# Patient Record
Sex: FEMALE | Race: WHITE | Employment: OTHER | URBAN - METROPOLITAN AREA
[De-identification: names, ages, dates, MRNs, and addresses within clinical notes are randomized per-mention and may not be internally consistent; named-entity substitution may affect disease eponyms.]

---

## 2017-05-10 ENCOUNTER — HOSPITAL ENCOUNTER (OUTPATIENT)
Dept: MAMMOGRAPHY | Age: 67
Discharge: HOME | End: 2017-05-10
Attending: SURGERY
Payer: MEDICARE

## 2017-05-10 DIAGNOSIS — Z12.31 SCREENING MAMMOGRAM, ENCOUNTER FOR: ICD-10-CM

## 2017-05-10 PROCEDURE — 77067 SCR MAMMO BI INCL CAD: CPT

## 2017-05-16 ENCOUNTER — HOSPITAL ENCOUNTER (OUTPATIENT)
Dept: MAMMOGRAPHY | Age: 67
Discharge: HOME | End: 2017-05-16
Attending: SURGERY
Payer: MEDICARE

## 2017-05-16 DIAGNOSIS — Z85.3 HX OF BREAST CANCER: ICD-10-CM

## 2017-05-16 DIAGNOSIS — R92.8 ABNORMAL SCREENING MAMMOGRAM: ICD-10-CM

## 2017-05-16 PROCEDURE — 77065 DX MAMMO INCL CAD UNI: CPT

## 2017-05-19 ENCOUNTER — HOSPITAL ENCOUNTER (OUTPATIENT)
Dept: MAMMOGRAPHY | Age: 67
Discharge: HOME | End: 2017-05-19
Attending: SURGERY
Payer: MEDICARE

## 2017-05-19 VITALS
SYSTOLIC BLOOD PRESSURE: 150 MMHG | TEMPERATURE: 96.6 F | DIASTOLIC BLOOD PRESSURE: 72 MMHG | OXYGEN SATURATION: 95 % | HEART RATE: 75 BPM

## 2017-05-19 DIAGNOSIS — R92.1 BREAST CALCIFICATION, LEFT: ICD-10-CM

## 2017-05-19 PROCEDURE — 77065 DX MAMMO INCL CAD UNI: CPT

## 2017-05-19 PROCEDURE — 88305 TISSUE EXAM BY PATHOLOGIST: CPT | Performed by: SURGERY

## 2017-05-19 PROCEDURE — 19081 BX BREAST 1ST LESION STRTCTC: CPT

## 2017-05-19 PROCEDURE — 74011250636 HC RX REV CODE- 250/636: Performed by: SURGERY

## 2017-05-19 PROCEDURE — 74011000250 HC RX REV CODE- 250: Performed by: SURGERY

## 2017-05-19 RX ORDER — LIDOCAINE HYDROCHLORIDE 10 MG/ML
10 INJECTION INFILTRATION; PERINEURAL
Status: COMPLETED | OUTPATIENT
Start: 2017-05-19 | End: 2017-05-19

## 2017-05-19 RX ORDER — LIDOCAINE HYDROCHLORIDE AND EPINEPHRINE 10; 10 MG/ML; UG/ML
4.5 INJECTION, SOLUTION INFILTRATION; PERINEURAL
Status: COMPLETED | OUTPATIENT
Start: 2017-05-19 | End: 2017-05-19

## 2017-05-19 RX ORDER — LIDOCAINE HYDROCHLORIDE AND EPINEPHRINE 10; 10 MG/ML; UG/ML
10 INJECTION, SOLUTION INFILTRATION; PERINEURAL
Status: COMPLETED | OUTPATIENT
Start: 2017-05-19 | End: 2017-05-19

## 2017-05-19 RX ADMIN — SODIUM CHLORIDE 250 ML: 900 INJECTION, SOLUTION INTRAVENOUS at 10:23

## 2017-05-19 RX ADMIN — LIDOCAINE HYDROCHLORIDE,EPINEPHRINE BITARTRATE 2 ML: 10; .01 INJECTION, SOLUTION INFILTRATION; PERINEURAL at 10:18

## 2017-05-19 RX ADMIN — LIDOCAINE HYDROCHLORIDE 2 ML: 10 INJECTION, SOLUTION INFILTRATION; PERINEURAL at 10:17

## 2017-05-19 RX ADMIN — LIDOCAINE HYDROCHLORIDE,EPINEPHRINE BITARTRATE 10 ML: 10; .01 INJECTION, SOLUTION INFILTRATION; PERINEURAL at 10:19

## 2017-11-20 ENCOUNTER — HOSPITAL ENCOUNTER (OUTPATIENT)
Dept: MAMMOGRAPHY | Age: 67
Discharge: HOME | End: 2017-11-20
Attending: SURGERY
Payer: MEDICARE

## 2017-11-20 DIAGNOSIS — N64.9 DISORDER OF BREAST: ICD-10-CM

## 2017-11-20 PROCEDURE — 77065 DX MAMMO INCL CAD UNI: CPT

## 2018-06-15 ENCOUNTER — HOSPITAL ENCOUNTER (OUTPATIENT)
Dept: MAMMOGRAPHY | Age: 68
Discharge: HOME | End: 2018-06-15
Attending: SURGERY
Payer: MEDICARE

## 2018-06-15 DIAGNOSIS — Z12.31 VISIT FOR SCREENING MAMMOGRAM: ICD-10-CM

## 2018-06-15 PROCEDURE — 77063 BREAST TOMOSYNTHESIS BI: CPT

## 2019-07-20 ENCOUNTER — HOSPITAL ENCOUNTER (OUTPATIENT)
Dept: MAMMOGRAPHY | Age: 69
Discharge: HOME | End: 2019-07-20
Attending: SURGERY
Payer: MEDICARE

## 2019-07-20 DIAGNOSIS — Z12.31 VISIT FOR SCREENING MAMMOGRAM: ICD-10-CM

## 2019-07-20 PROCEDURE — 77063 BREAST TOMOSYNTHESIS BI: CPT

## 2020-08-20 ENCOUNTER — HOSPITAL ENCOUNTER (OUTPATIENT)
Dept: MAMMOGRAPHY | Age: 70
Discharge: HOME | End: 2020-08-20
Attending: SURGERY
Payer: MEDICARE

## 2020-08-20 DIAGNOSIS — Z12.39 BREAST CANCER SCREENING: ICD-10-CM

## 2020-08-20 DIAGNOSIS — Z12.31 ENCOUNTER FOR SCREENING MAMMOGRAM FOR MALIGNANT NEOPLASM OF BREAST: ICD-10-CM

## 2020-08-20 PROCEDURE — 77067 SCR MAMMO BI INCL CAD: CPT

## 2020-09-01 PROBLEM — E66.01 SEVERE OBESITY (HCC): Status: ACTIVE | Noted: 2020-09-01

## 2020-09-01 PROBLEM — Z85.3 HISTORY OF LEFT BREAST CANCER: Status: ACTIVE | Noted: 2020-09-01

## 2021-09-15 ENCOUNTER — HOSPITAL ENCOUNTER (OUTPATIENT)
Dept: MAMMOGRAPHY | Age: 71
Discharge: HOME | End: 2021-09-15
Attending: SURGERY
Payer: MEDICARE

## 2021-09-15 ENCOUNTER — TRANSCRIBE ORDER (OUTPATIENT)
Dept: SCHEDULING | Age: 71
End: 2021-09-15

## 2021-09-15 DIAGNOSIS — Z12.31 VISIT FOR SCREENING MAMMOGRAM: Primary | ICD-10-CM

## 2021-09-15 DIAGNOSIS — Z12.31 VISIT FOR SCREENING MAMMOGRAM: ICD-10-CM

## 2021-09-15 PROCEDURE — 77063 BREAST TOMOSYNTHESIS BI: CPT

## 2022-03-18 PROBLEM — Z85.3 HISTORY OF LEFT BREAST CANCER: Status: ACTIVE | Noted: 2020-09-01

## 2022-03-19 PROBLEM — E66.01 SEVERE OBESITY (HCC): Status: ACTIVE | Noted: 2020-09-01

## 2022-11-03 ENCOUNTER — HOSPITAL ENCOUNTER (OUTPATIENT)
Dept: MAMMOGRAPHY | Age: 72
Discharge: HOME | End: 2022-11-06
Payer: MEDICARE

## 2022-11-03 DIAGNOSIS — Z12.31 OTHER SCREENING MAMMOGRAM: ICD-10-CM

## 2022-11-03 PROCEDURE — 77063 BREAST TOMOSYNTHESIS BI: CPT

## 2022-11-10 ENCOUNTER — OFFICE VISIT (OUTPATIENT)
Dept: SURGERY | Age: 72
End: 2022-11-10
Payer: MEDICARE

## 2022-11-10 VITALS — BODY MASS INDEX: 39.88 KG/M2 | HEIGHT: 58 IN | WEIGHT: 190 LBS

## 2022-11-10 DIAGNOSIS — C50.912 MALIGNANT NEOPLASM OF LEFT FEMALE BREAST, UNSPECIFIED ESTROGEN RECEPTOR STATUS, UNSPECIFIED SITE OF BREAST (HCC): Primary | ICD-10-CM

## 2022-11-10 PROCEDURE — G8484 FLU IMMUNIZE NO ADMIN: HCPCS | Performed by: SURGERY

## 2022-11-10 PROCEDURE — 3017F COLORECTAL CA SCREEN DOC REV: CPT | Performed by: SURGERY

## 2022-11-10 PROCEDURE — 1123F ACP DISCUSS/DSCN MKR DOCD: CPT | Performed by: SURGERY

## 2022-11-10 PROCEDURE — G8427 DOCREV CUR MEDS BY ELIG CLIN: HCPCS | Performed by: SURGERY

## 2022-11-10 PROCEDURE — G8417 CALC BMI ABV UP PARAM F/U: HCPCS | Performed by: SURGERY

## 2022-11-10 PROCEDURE — G8400 PT W/DXA NO RESULTS DOC: HCPCS | Performed by: SURGERY

## 2022-11-10 PROCEDURE — 99213 OFFICE O/P EST LOW 20 MIN: CPT | Performed by: SURGERY

## 2022-11-10 PROCEDURE — 1090F PRES/ABSN URINE INCON ASSESS: CPT | Performed by: SURGERY

## 2022-11-10 PROCEDURE — 1036F TOBACCO NON-USER: CPT | Performed by: SURGERY

## 2022-11-10 NOTE — PROGRESS NOTES
[facility]  [address] [place] [address]  [phone]    Office Note/H&P/Consult Note   Mone Vasquez   MRN: [id number]     : 1950        HPI: Mone Vasquez is a 74 y.o. female who is here to see me this morning for follow-up breast exam.  She had left breast carcinoma in the . She has been doing very well since that time. Mammogram performed on 2022 showed stable postoperative appearance of the left breast with no specific mammographic evidence for malignancy on either side. She is having no problems. She does do self breast exams. Past Medical History:   Diagnosis Date    Breast cancer ([facility] [address]     Lt Lumpectomy    Chemotherapy convalescence or palliative care     Radiation therapy complication 9491    Lt Lumpectomy - Rad Tx & Chemo     Past Surgical History:   Procedure Laterality Date    BREAST BIOPSY Right     Negative per pt    BREAST LUMPECTOMY Left           Current Outpatient Medications   Medication Sig    amLODIPine (NORVASC) 10 MG tablet Take 10 mg by mouth    aspirin 81 MG EC tablet Take 81 mg by mouth    Cholecalciferol 50 MCG ( UT) CAPS Take 1 capsule by mouth    levothyroxine (SYNTHROID) 50 MCG tablet TAKE ONE TABLET BY MOUTH EVERY DAY    metoprolol succinate (TOPROL XL) 50 MG extended release tablet Take 50 mg by mouth    raNITIdine (ZANTAC) 150 MG tablet Take 150 mg by mouth    simvastatin (ZOCOR) 40 MG tablet Take 40 mg by mouth     No current facility-administered medications for this visit.      ALLERGIES:  Lisinopril and Penicillins    Social History     Socioeconomic History    Marital status:      Spouse name: None    Number of children: None    Years of education: None    Highest education level: None   Tobacco Use    Smoking status: Never    Smokeless tobacco: Never     Social History     Tobacco Use   Smoking Status Never   Smokeless Tobacco Never     Family History   Problem Relation Age of Onset    Breast Cancer Mother         [de-identified]       ROS: The patient has no difficulty with chest pain or shortness of breath. No fever or chills. Comprehensive review of systems was otherwise unremarkable except as noted above. Physical Exam:   Constitutional: Alert oriented cooperative patient in no acute distress. Ht 4' 10\" (1.473 m)   Wt 190 lb (86.2 kg)   BMI 39.71 kg/m²   Eyes:Sclera are clear, pupils symmetric   ENMT: ears have no obvious external lesions; no obvious neck masses; no lip lesions  CV: RRR. Normal perfusion; no embolic signs  Resp: No JVD. Breathing is  non-labored. No audible wheezing   GI: soft and non-distended     Musculoskeletal: no significant asymmetry; normal tone; unremarkable with normal function. Neuro:  No obvious focal deficits; moves all 4; A&O x3  Psychiatric: normal affect and mood, no memory impairment  Cyst-no abnormality of either breast.  Axillas are normal.  No cervical or supraclavicular lymphadenopathy. Labs:  No results found for: WBC, HGB, PLT, NA, K, CL, CO2, BUN, CREA, GLU, INR, APTT, ALT, AML, LCAD    No results found for this or any previous visit. I reviewed patient's medications, labs, and independently reviewed relevant radiologic studies if available. Labs/radiology studies as ordered in connect care or in scanned in media tab if pt is pre-op. Amt of data reviewed moderate-extensive. I spent 15 to 20 minutes with her this morning and greater than 50% of this time was spent in counseling as well as review of previous studies. Assessment/Plan:     )Alona Berrios is a 74 y.o. female who has signs and symptoms consistent with the below issues:  History of left breast cancer. Mammograms normal.  Doing well. See her in 1 year with bilateral mammograms.           Joseph Sanders MD,  FACS

## 2023-10-12 ENCOUNTER — TRANSCRIBE ORDERS (OUTPATIENT)
Dept: SCHEDULING | Age: 73
End: 2023-10-12

## 2023-10-12 DIAGNOSIS — Z12.31 SCREENING MAMMOGRAM FOR HIGH-RISK PATIENT: Primary | ICD-10-CM

## 2023-11-07 ENCOUNTER — HOSPITAL ENCOUNTER (OUTPATIENT)
Dept: MAMMOGRAPHY | Age: 73
Discharge: HOME | End: 2023-11-10
Payer: MEDICARE

## 2023-11-07 DIAGNOSIS — Z12.39 BREAST CANCER SCREENING, HIGH RISK PATIENT: ICD-10-CM

## 2023-11-07 DIAGNOSIS — C50.912 MALIGNANT NEOPLASM OF LEFT FEMALE BREAST, UNSPECIFIED ESTROGEN RECEPTOR STATUS, UNSPECIFIED SITE OF BREAST (HCC): ICD-10-CM

## 2023-11-07 PROCEDURE — 77063 BREAST TOMOSYNTHESIS BI: CPT

## 2023-12-06 NOTE — PROGRESS NOTES
[facility]  [phone]        poDate: 2023      Name: Star Henderson      MRN: [id number]       : 1950       Age: 74 y.o. Sex: female        Mushtaq Reyna MD       CC:  No chief complaint on file. HPI:  The patient presents for a breast check. Recent mammogram showed no evidence of malignancy. She has had no problems. She does have an area on her left forearm that she would like to have excised. We will do this later in the office. Physical Exam:     There were no vitals taken for this visit. General: Alert, oriented, cooperative and in no acute distress. Neck: Supple, trachea midline, no appreciable thyromegaly  Resp: No JVD. Breathing is  non-labored. Lungs clear to auscultation without wheezing or rhonchi   CV: RRR. No murmurs, rubs or gallops appreciated. Abd: soft non-tender and non-distended without peritoneal signs. +bs    Skin/incision:  Clean, dry and intact. Assessment/Plan:  Star Henderson is a 74 y.o. female who is here for her yearly breast check. She has had no problems whatsoever. Recent mammograms were normal and recommendation was for repeat mammogram in 1 year    1. See back in 1 year after repeat mammogram.    2. Return to full activity    3. Regular diet    Signed: Srikanth Rodríguez [place]    2023  3:44 PM

## 2023-12-07 ENCOUNTER — OFFICE VISIT (OUTPATIENT)
Dept: SURGERY | Age: 73
End: 2023-12-07
Payer: MEDICARE

## 2023-12-07 VITALS — HEIGHT: 58 IN | BODY MASS INDEX: 39.88 KG/M2 | WEIGHT: 190 LBS

## 2023-12-07 DIAGNOSIS — Z85.3 HISTORY OF LEFT BREAST CANCER: Primary | ICD-10-CM

## 2023-12-07 PROCEDURE — G8417 CALC BMI ABV UP PARAM F/U: HCPCS | Performed by: SURGERY

## 2023-12-07 PROCEDURE — G8427 DOCREV CUR MEDS BY ELIG CLIN: HCPCS | Performed by: SURGERY

## 2023-12-07 PROCEDURE — 1123F ACP DISCUSS/DSCN MKR DOCD: CPT | Performed by: SURGERY

## 2023-12-07 PROCEDURE — G8484 FLU IMMUNIZE NO ADMIN: HCPCS | Performed by: SURGERY

## 2023-12-07 PROCEDURE — 99213 OFFICE O/P EST LOW 20 MIN: CPT | Performed by: SURGERY

## 2023-12-07 PROCEDURE — 3017F COLORECTAL CA SCREEN DOC REV: CPT | Performed by: SURGERY

## 2023-12-07 PROCEDURE — 1036F TOBACCO NON-USER: CPT | Performed by: SURGERY

## 2023-12-07 PROCEDURE — G8400 PT W/DXA NO RESULTS DOC: HCPCS | Performed by: SURGERY

## 2023-12-07 PROCEDURE — 1090F PRES/ABSN URINE INCON ASSESS: CPT | Performed by: SURGERY

## 2023-12-28 ENCOUNTER — OFFICE VISIT (OUTPATIENT)
Dept: SURGERY | Age: 73
End: 2023-12-28
Payer: MEDICARE

## 2023-12-28 DIAGNOSIS — L98.9 ARM SKIN LESION, LEFT: Primary | ICD-10-CM

## 2023-12-28 DIAGNOSIS — R22.32 MASS OF ARM, LEFT: Primary | ICD-10-CM

## 2023-12-28 PROCEDURE — 1036F TOBACCO NON-USER: CPT | Performed by: SURGERY

## 2023-12-28 PROCEDURE — 3017F COLORECTAL CA SCREEN DOC REV: CPT | Performed by: SURGERY

## 2023-12-28 PROCEDURE — G8400 PT W/DXA NO RESULTS DOC: HCPCS | Performed by: SURGERY

## 2023-12-28 PROCEDURE — 1123F ACP DISCUSS/DSCN MKR DOCD: CPT | Performed by: SURGERY

## 2023-12-28 PROCEDURE — 99213 OFFICE O/P EST LOW 20 MIN: CPT | Performed by: SURGERY

## 2023-12-28 PROCEDURE — G8427 DOCREV CUR MEDS BY ELIG CLIN: HCPCS | Performed by: SURGERY

## 2023-12-28 PROCEDURE — 1090F PRES/ABSN URINE INCON ASSESS: CPT | Performed by: SURGERY

## 2023-12-28 PROCEDURE — G8484 FLU IMMUNIZE NO ADMIN: HCPCS | Performed by: SURGERY

## 2023-12-28 PROCEDURE — G8417 CALC BMI ABV UP PARAM F/U: HCPCS | Performed by: SURGERY

## 2023-12-28 NOTE — PROGRESS NOTES
[facility]  [address] [facility], [address], [address]  [facility]  [phone]    Office Note/H&P/Consult Note   Bryan Olivarez   MRN: [id number]     : 1950        HPI: Bryan Olivarez is a 74 y.o. female who is here to have a left forearm lesion removed. This been present for some time. Past Medical History:   Diagnosis Date    Breast cancer ([address])     Lt Lumpectomy    Chemotherapy convalescence or palliative care     Radiation therapy complication 7772    Lt Lumpectomy - Rad Tx & Chemo     Past Surgical History:   Procedure Laterality Date    BREAST BIOPSY Right     Negative per pt    BREAST LUMPECTOMY Left           Current Outpatient Medications   Medication Sig    amLODIPine (NORVASC) 10 MG tablet Take 1 tablet by mouth    aspirin 81 MG EC tablet Take 1 tablet by mouth    Cholecalciferol 50 MCG (2000) CAPS Take 1 capsule by mouth    levothyroxine (SYNTHROID) 50 MCG tablet TAKE ONE TABLET BY MOUTH EVERY DAY    metoprolol succinate (TOPROL XL) 50 MG extended release tablet Take 1 tablet by mouth    raNITIdine (ZANTAC) 150 MG tablet Take 150 mg by mouth    simvastatin (ZOCOR) 40 MG tablet Take 40 mg by mouth     No current facility-administered medications for this visit. ALLERGIES:  Lisinopril and Penicillins    Social History     Socioeconomic History    Marital status:    Tobacco Use    Smoking status: Never    Smokeless tobacco: Never     Social History     Tobacco Use   Smoking Status Never   Smokeless Tobacco Never     Family History   Problem Relation Age of Onset    Breast Cancer Mother         80's       ROS: The patient has no difficulty with chest pain or shortness of breath. No fever or chills. Comprehensive review of systems was otherwise unremarkable except as noted above. Physical Exam:   Constitutional: Alert oriented cooperative patient in no acute distress. There were no vitals taken for this visit.   Eyes:Sclera are clear,

## 2024-01-04 ENCOUNTER — OFFICE VISIT (OUTPATIENT)
Dept: SURGERY | Age: 74
End: 2024-01-04

## 2024-01-04 VITALS — BODY MASS INDEX: 39.88 KG/M2 | WEIGHT: 190 LBS | HEIGHT: 58 IN

## 2024-01-04 DIAGNOSIS — Z48.89 POSTOPERATIVE VISIT: Primary | ICD-10-CM

## 2024-01-04 PROCEDURE — 99024 POSTOP FOLLOW-UP VISIT: CPT | Performed by: SURGERY

## 2024-01-04 NOTE — PROGRESS NOTES
[address] [address]  [phone]        poDate: 2024      Name: Melissa Hodges      MRN: [id number]       : 1950       Age: 73 y.o.    Sex: female        Ray Smith MD       CC:    Chief Complaint   Patient presents with    Post-Op Check       HPI:  The patient presents for a post-op visit s/p excision of lesion of left forearm.  Pathology is as follows  Date Obtained:   2023   DIAGNOSIS       A:  \" LESION ON LEFT FOREARM\":         NODULAR BASAL CELL CARCINOMA. MARKED DEEP AS EPITHELIAL MARGINS OF   RESECTION IN PLANES OF SECTION ARE NEGATIVE FOR MALIGNANT TUMOR             jtl/2023   Electronically Signed Out         Sign Out Date: 2023  FDAUMO Stone Jr., M.D.     She has done well.  Physical Exam:     Ht 1.473 m (4' 10\")   Wt 86.2 kg (190 lb)   BMI 39.71 kg/m²     General: Alert, oriented, cooperative and in no acute distress.     Neck: Supple, trachea midline, no appreciable thyromegaly  Resp: No JVD.  Breathing is  non-labored. Lungs clear to auscultation without wheezing or rhonchi   CV: RRR. No murmurs, rubs or gallops appreciated.  Abd: soft non-tender and non-distended without peritoneal signs. +bs    Skin/incision:  Clean, dry and intact.    Assessment/Plan:  Melissa Hodges is a 73 y.o. female who is s/p excision of lesion of left forearm.    1. Follow-up as needed    2. Return to full activity    3. Regular diet    Signed: HEMANTH DONG JR, MD    2024  11:51 AM

## 2024-11-25 ENCOUNTER — TRANSCRIBE ORDERS (OUTPATIENT)
Dept: SCHEDULING | Age: 74
End: 2024-11-25

## 2024-11-25 DIAGNOSIS — Z12.31 ENCOUNTER FOR SCREENING MAMMOGRAM FOR HIGH-RISK PATIENT: Primary | ICD-10-CM

## 2025-01-02 ENCOUNTER — HOSPITAL ENCOUNTER (OUTPATIENT)
Dept: MAMMOGRAPHY | Age: 75
Discharge: HOME | End: 2025-01-02
Payer: MEDICARE

## 2025-01-02 VITALS — HEIGHT: 58 IN | BODY MASS INDEX: 39.88 KG/M2 | WEIGHT: 190 LBS

## 2025-01-02 DIAGNOSIS — Z12.31 ENCOUNTER FOR SCREENING MAMMOGRAM FOR HIGH-RISK PATIENT: ICD-10-CM

## 2025-01-02 PROCEDURE — 77063 BREAST TOMOSYNTHESIS BI: CPT

## 2025-01-07 ENCOUNTER — OFFICE VISIT (OUTPATIENT)
Age: 75
End: 2025-01-07
Payer: MEDICARE

## 2025-01-07 VITALS — HEIGHT: 58 IN | BODY MASS INDEX: 34.63 KG/M2 | WEIGHT: 165 LBS

## 2025-01-07 DIAGNOSIS — Z85.3 HISTORY OF LEFT BREAST CANCER: Primary | ICD-10-CM

## 2025-01-07 PROCEDURE — 1090F PRES/ABSN URINE INCON ASSESS: CPT | Performed by: SURGERY

## 2025-01-07 PROCEDURE — 1123F ACP DISCUSS/DSCN MKR DOCD: CPT | Performed by: SURGERY

## 2025-01-07 PROCEDURE — G8427 DOCREV CUR MEDS BY ELIG CLIN: HCPCS | Performed by: SURGERY

## 2025-01-07 PROCEDURE — 1159F MED LIST DOCD IN RCRD: CPT | Performed by: SURGERY

## 2025-01-07 PROCEDURE — M1308 PR FLU IMMUNIZE NO ADMIN: HCPCS | Performed by: SURGERY

## 2025-01-07 PROCEDURE — G8417 CALC BMI ABV UP PARAM F/U: HCPCS | Performed by: SURGERY

## 2025-01-07 PROCEDURE — 1036F TOBACCO NON-USER: CPT | Performed by: SURGERY

## 2025-01-07 PROCEDURE — 99213 OFFICE O/P EST LOW 20 MIN: CPT | Performed by: SURGERY

## 2025-01-07 PROCEDURE — G8400 PT W/DXA NO RESULTS DOC: HCPCS | Performed by: SURGERY

## 2025-01-07 PROCEDURE — 1160F RVW MEDS BY RX/DR IN RCRD: CPT | Performed by: SURGERY

## 2025-01-07 NOTE — PROGRESS NOTES
[facility]  [address] [facility], [address]  [place] [address]  [phone]    Office Note/H&P/Consult Note   Melissa Hodges   MRN: [id number]     : 1950        HPI: Melissa Hodges is a 74 y.o. female who is here today for breast check and review of her recent mammograms performed on 2025.  She has a history of a right benign breast biopsy.  Left lumpectomy for breast cancer .  She has had no problems.  Mammogram is normal.        Past Medical History:   Diagnosis Date    Breast cancer ([facility])     Lt Lumpectomy    Chemotherapy convalescence or palliative care     Radiation therapy complication     Lt Lumpectomy - Rad Tx & Chemo     Past Surgical History:   Procedure Laterality Date    BREAST BIOPSY Right     Negative per pt    BREAST LUMPECTOMY Left           Current Outpatient Medications   Medication Sig    amLODIPine (NORVASC) 10 MG tablet Take 1 tablet by mouth    aspirin 81 MG EC tablet Take 1 tablet by mouth    Cholecalciferol 50 MCG (2000 UT) CAPS Take 1 capsule by mouth    levothyroxine (SYNTHROID) 50 MCG tablet TAKE ONE TABLET BY MOUTH EVERY DAY    metoprolol succinate (TOPROL XL) 50 MG extended release tablet Take 1 tablet by mouth    raNITIdine (ZANTAC) 150 MG tablet Take 150 mg by mouth    simvastatin (ZOCOR) 40 MG tablet Take 40 mg by mouth     No current facility-administered medications for this visit.     ALLERGIES:  Lisinopril and Penicillins    Social History     Socioeconomic History    Marital status:      Spouse name: None    Number of children: None    Years of education: None    Highest education level: None   Tobacco Use    Smoking status: Never    Smokeless tobacco: Never     Social Determinants of Health     Financial Resource Strain: Low Risk  (2023)    Received from [facility]    Financial Resource Strain     Difficulty Paying Living Expenses: Not hard at all     Difficulty Paying Medical Expenses: